# Patient Record
Sex: FEMALE | Race: WHITE | NOT HISPANIC OR LATINO
[De-identification: names, ages, dates, MRNs, and addresses within clinical notes are randomized per-mention and may not be internally consistent; named-entity substitution may affect disease eponyms.]

---

## 2017-10-05 ENCOUNTER — APPOINTMENT (OUTPATIENT)
Dept: OPHTHALMOLOGY | Facility: CLINIC | Age: 37
End: 2017-10-05
Payer: COMMERCIAL

## 2017-10-05 DIAGNOSIS — H00.12 CHALAZION RIGHT LOWER EYELID: ICD-10-CM

## 2017-10-05 DIAGNOSIS — H01.006 UNSPECIFIED BLEPHARITIS LEFT EYE, UNSPECIFIED EYELID: ICD-10-CM

## 2017-10-05 DIAGNOSIS — H01.003 UNSPECIFIED BLEPHARITIS RIGHT EYE, UNSPECIFIED EYELID: ICD-10-CM

## 2017-10-05 PROCEDURE — 92002 INTRM OPH EXAM NEW PATIENT: CPT

## 2022-03-08 ENCOUNTER — APPOINTMENT (OUTPATIENT)
Dept: HEART AND VASCULAR | Facility: CLINIC | Age: 42
End: 2022-03-08
Payer: COMMERCIAL

## 2022-03-08 VITALS
DIASTOLIC BLOOD PRESSURE: 74 MMHG | OXYGEN SATURATION: 97 % | BODY MASS INDEX: 35.92 KG/M2 | WEIGHT: 213 LBS | TEMPERATURE: 98.1 F | HEART RATE: 80 BPM | SYSTOLIC BLOOD PRESSURE: 100 MMHG | HEIGHT: 64.5 IN

## 2022-03-08 DIAGNOSIS — Z00.00 ENCOUNTER FOR GENERAL ADULT MEDICAL EXAMINATION W/OUT ABNORMAL FINDINGS: ICD-10-CM

## 2022-03-08 PROCEDURE — 93000 ELECTROCARDIOGRAM COMPLETE: CPT

## 2022-03-08 PROCEDURE — 99203 OFFICE O/P NEW LOW 30 MIN: CPT | Mod: 25

## 2022-03-08 PROCEDURE — 36415 COLL VENOUS BLD VENIPUNCTURE: CPT

## 2022-03-09 LAB
ALBUMIN SERPL ELPH-MCNC: 4.5 G/DL
ALP BLD-CCNC: 90 U/L
ALT SERPL-CCNC: 25 U/L
ANION GAP SERPL CALC-SCNC: 15 MMOL/L
AST SERPL-CCNC: 20 U/L
BASOPHILS # BLD AUTO: 0.05 K/UL
BASOPHILS NFR BLD AUTO: 0.4 %
BILIRUB SERPL-MCNC: 0.2 MG/DL
BUN SERPL-MCNC: 17 MG/DL
CALCIUM SERPL-MCNC: 9.7 MG/DL
CHLORIDE SERPL-SCNC: 102 MMOL/L
CHOLEST SERPL-MCNC: 221 MG/DL
CK SERPL-CCNC: 240 U/L
CO2 SERPL-SCNC: 22 MMOL/L
CREAT SERPL-MCNC: 0.61 MG/DL
EGFR: 117 ML/MIN/1.73M2
EOSINOPHIL # BLD AUTO: 0.2 K/UL
EOSINOPHIL NFR BLD AUTO: 1.7 %
ESTIMATED AVERAGE GLUCOSE: 100 MG/DL
FERRITIN SERPL-MCNC: 107 NG/ML
GLUCOSE SERPL-MCNC: 116 MG/DL
HBA1C MFR BLD HPLC: 5.1 %
HCT VFR BLD CALC: 41.8 %
HDLC SERPL-MCNC: 59 MG/DL
HGB BLD-MCNC: 13.2 G/DL
IMM GRANULOCYTES NFR BLD AUTO: 0.3 %
LDLC SERPL CALC-MCNC: 125 MG/DL
LYMPHOCYTES # BLD AUTO: 2.6 K/UL
LYMPHOCYTES NFR BLD AUTO: 22.7 %
MAN DIFF?: NORMAL
MCHC RBC-ENTMCNC: 27.7 PG
MCHC RBC-ENTMCNC: 31.6 GM/DL
MCV RBC AUTO: 87.6 FL
MONOCYTES # BLD AUTO: 0.77 K/UL
MONOCYTES NFR BLD AUTO: 6.7 %
NEUTROPHILS # BLD AUTO: 7.8 K/UL
NEUTROPHILS NFR BLD AUTO: 68.2 %
NONHDLC SERPL-MCNC: 162 MG/DL
PLATELET # BLD AUTO: 429 K/UL
POTASSIUM SERPL-SCNC: 4.1 MMOL/L
PROT SERPL-MCNC: 6.9 G/DL
RBC # BLD: 4.77 M/UL
RBC # FLD: 12.4 %
SODIUM SERPL-SCNC: 139 MMOL/L
TRIGL SERPL-MCNC: 183 MG/DL
TSH SERPL-ACNC: 1.16 UIU/ML
VIT B12 SERPL-MCNC: 608 PG/ML
WBC # FLD AUTO: 11.46 K/UL

## 2022-03-29 ENCOUNTER — APPOINTMENT (OUTPATIENT)
Dept: HEART AND VASCULAR | Facility: CLINIC | Age: 42
End: 2022-03-29
Payer: COMMERCIAL

## 2022-03-29 PROCEDURE — 93015 CV STRESS TEST SUPVJ I&R: CPT

## 2022-03-29 PROCEDURE — 99213 OFFICE O/P EST LOW 20 MIN: CPT | Mod: 25

## 2022-03-29 PROCEDURE — 93306 TTE W/DOPPLER COMPLETE: CPT

## 2022-03-29 NOTE — ASSESSMENT
[FreeTextEntry1] : Patient had a negative stress test today patient is encouraged to start regular exercise program patient can do more than she thinks as proven on the exercise stress test patient's echocardiogram does not reveal any etiology for shortness of breath patient will return in 6 months at that time we will reevaluate her lipid profile patient's goal is to lose 20 pounds in 6 months

## 2022-09-29 ENCOUNTER — APPOINTMENT (OUTPATIENT)
Dept: HEART AND VASCULAR | Facility: CLINIC | Age: 42
End: 2022-09-29

## 2022-09-29 NOTE — HISTORY OF PRESENT ILLNESS
[FreeTextEntry1] : Since her last visit, \par \par ------------------\par Labs (2022)\par Lipid profile: TC: 221, T, HDL: 59, LDL: 125\par A1c: 5/1%

## 2022-09-29 NOTE — CARDIOLOGY SUMMARY
[de-identified] : 03/29/2022: Normal stress EKG [de-identified] : 03/29/2022: No significant valvular disease, normal LVSF with EF 71%, normal pulm pressures

## 2023-03-06 ENCOUNTER — EMERGENCY (EMERGENCY)
Facility: HOSPITAL | Age: 43
LOS: 1 days | Discharge: HOME | End: 2023-03-06
Attending: EMERGENCY MEDICINE | Admitting: EMERGENCY MEDICINE
Payer: COMMERCIAL

## 2023-03-06 VITALS
SYSTOLIC BLOOD PRESSURE: 134 MMHG | WEIGHT: 192.9 LBS | HEART RATE: 67 BPM | OXYGEN SATURATION: 97 % | TEMPERATURE: 98 F | DIASTOLIC BLOOD PRESSURE: 83 MMHG | RESPIRATION RATE: 18 BRPM
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
## 2023-03-06 DIAGNOSIS — S60.445A EXTERNAL CONSTRICTION OF LEFT RING FINGER, INITIAL ENCOUNTER: ICD-10-CM

## 2023-03-06 DIAGNOSIS — M79.645 PAIN IN LEFT FINGER(S): ICD-10-CM

## 2023-03-06 DIAGNOSIS — Z88.0 ALLERGY STATUS TO PENICILLIN: ICD-10-CM
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
## 2023-03-06 PROCEDURE — 99282 EMERGENCY DEPT VISIT SF MDM: CPT

## 2023-03-06 PROCEDURE — 99283 EMERGENCY DEPT VISIT LOW MDM: CPT

## 2023-03-07 NOTE — ED PROVIDER NOTE - CLINICAL SUMMARY MEDICAL DECISION MAKING FREE TEXT BOX
hx obtained from pt. krupa. found to have stuck L 4th digit ring since 10a. s/p successful ring removal w/ ice/lubricant/string method. tolerated procedure well. neurovasc intact. no skin discoloration or wounds. improved sx s/p obs. requesting to go home. strict return precautions. understands to refrain from any ring wearing.

## 2023-03-07 NOTE — ED PROVIDER NOTE - NSFOLLOWUPINSTRUCTIONS_ED_ALL_ED_FT
CONTINUE TO ELEVATED AND ICE FINGER (20MIN ON/OFF) AS TOLERATED. AVOID FURTHER RING USE.    MONITOR FINGER OVER THE NEXT 24-48HR. IF SYMPTOMS COMPLETELY RESOLVE, NO NEED TO SEE HAND SPECIALIST. HOWEVER, IF WORSENING FINGER PAIN/SWELLING, TINGLING/NUMBNESS, WEAKNESS/DECREASED RANGE OF MOTION, SKIN DISCOLORATION/RASH, THEN PLEASE FOLLOW-UP WITH HAND SPECIALIST.    PLEASE CONTACT JET HUMPHRIES ([facility] EMERGENCY DEPARTMENT CLINICAL REFERRAL COORDINATOR) TO ASSIST IN SCHEDULING YOUR FOLLOW-UP APPOINTMENT.    Monday - Friday 11am-7pm  [phone]  [email or web address]

## 2023-03-07 NOTE — ED ADULT NURSE REASSESSMENT NOTE - NS ED NURSE REASSESS COMMENT FT1
MD Carbajal and BRENDA De Guzman at bedside.  used tub of ice and lube to remove ring from finger.  cap refill adequate, <2 sec.  pt c/o small amount of pain.  pt staying for observation until MD Carbajal clears for discharge. Notified Dr. Jozef Contreras that patient was admitted on 10/20 under Dr. Douglass Severe. Dr. Jozef Contreras states that she would like [facility] to continue to see patient and she will F/U with patient in 1 week.

## 2023-03-07 NOTE — ED PROVIDER NOTE - PHYSICAL EXAMINATION
CONST: obese nontoxic NAD speaking in full sentences  EXT: +stuck L 4th digit ring w/ distal knuckle swelling/pain/minimal erythema, distal finger sensation intact w/o dusky appearance, FROM, symmetric distal pulses intact  SKIN: warm, dry, cap refill <2sec  NEURO: a+ox3, 5/5 strength x4, gross sensation intact x4, baseline gait

## 2023-03-07 NOTE — ED PROVIDER NOTE - PATIENT PORTAL LINK FT
You can access the FollowMyHealth Patient Portal offered by [facility] by registering at the following website: [email or web address]. By joining [employer]’s FollowMyHealth portal, you will also be able to view your health information using other applications (apps) compatible with our system.

## 2023-03-07 NOTE — ED PROVIDER NOTE - PROGRESS NOTE DETAILS
s/p successful ring removal w/ ice/lubricant/manual removal. tolerated procedure well. neurovasc intact. no skin discoloration or wounds. s/p successful ring removal w/ ice/lubricant/string method. tolerated procedure well. neurovasc intact. no skin discoloration or wounds. improved sx. requesting to go home. strict return precautions. understands to refrain from any ring wearing.

## 2023-03-07 NOTE — ED ADULT NURSE NOTE - NSIMPLEMENTINTERV_GEN_ALL_ED
Implemented All Universal Safety Interventions:  [place] to call system. Call bell, personal items and telephone within reach. Instruct patient to call for assistance. Room bathroom lighting operational. Non-slip footwear when patient is off stretcher. Physically safe environment: no spills, clutter or unnecessary equipment. Stretcher in lowest position, wheels locked, appropriate side rails in place.

## 2023-03-07 NOTE — ED PROVIDER NOTE - CARE PROVIDER_API CALL
Gerhard Cummings)  Plastic Surgery  [address], [address]  [place] [address]  Phone: [phone]  Fax: [phone]  Follow Up Time: Urgent

## 2023-03-07 NOTE — ED ADULT NURSE NOTE - OBJECTIVE STATEMENT
pt c/o unable to take off wedding ring after putting it on after weight loss.  L ring finger appears red/swollen.  capillary refill adequate.  MD Carbajal at bedside attempting removal of ring.